# Patient Record
Sex: MALE | Race: BLACK OR AFRICAN AMERICAN | NOT HISPANIC OR LATINO | Employment: UNEMPLOYED | URBAN - METROPOLITAN AREA
[De-identification: names, ages, dates, MRNs, and addresses within clinical notes are randomized per-mention and may not be internally consistent; named-entity substitution may affect disease eponyms.]

---

## 2018-01-28 ENCOUNTER — HOSPITAL ENCOUNTER (EMERGENCY)
Facility: CLINIC | Age: 28
Discharge: HOME | End: 2018-01-28
Attending: EMERGENCY MEDICINE | Admitting: EMERGENCY MEDICINE
Payer: MEDICAID

## 2018-01-28 VITALS
HEIGHT: 66 IN | SYSTOLIC BLOOD PRESSURE: 130 MMHG | WEIGHT: 140 LBS | RESPIRATION RATE: 16 BRPM | TEMPERATURE: 97.2 F | BODY MASS INDEX: 22.5 KG/M2 | OXYGEN SATURATION: 100 % | DIASTOLIC BLOOD PRESSURE: 48 MMHG

## 2018-01-28 DIAGNOSIS — F19.10 DRUG ABUSE (H): ICD-10-CM

## 2018-01-28 PROCEDURE — 99282 EMERGENCY DEPT VISIT SF MDM: CPT

## 2018-01-28 ASSESSMENT — ENCOUNTER SYMPTOMS
ABDOMINAL PAIN: 0
VOMITING: 0
NUMBNESS: 0
SHORTNESS OF BREATH: 0
DIARRHEA: 0
NAUSEA: 0

## 2018-01-28 NOTE — DISCHARGE INSTRUCTIONS
Please stop using drugs as these can be harmful to your health.      Please return to the ED if any chest pain shortness weakness numbness or tingling.        Signs of Addiction: Social Use  Not everyone who takes a drink or tries a drug has a substance abuse problem. But for some people, what starts as social use can lead to problem use (abuse) and then addiction. Some people can have a drink or use another substance and then stop. They don t think about drinking or using again for a while. For some this will never lead to heavier use. For others, it may.    You can take it or leave it    You have a drink at a party or a glass of wine with dinner.    You tried a street drug a few times, but have no interest in using it repeatedly.     You take medicines only when needed and only as directed.    You don t think about using or plan the next time you ll use.  Using is still a choice  A social user can choose to say yes or no any time. This person is in control of his or her substance use. Think about these statements:    I can put down a drink without finishing it.    I can enjoy myself at a social event without using.    I can go without using or thinking about using.  If this describes you, you re most likely a social user. But ask yourself, can you really take it or leave it? You might try for a few weeks or even a month. If you find it hard or you just can t do it, maybe you need to think about your use again.  Date Last Reviewed: 2/1/2017 2000-2017 [employer]. [address] [place] [address], [place] [id number]. All rights reserved. This information is not intended as a substitute for professional medical care. Always follow your healthcare professional's instructions.          Alcohol Addiction  Are you addicted to alcohol?    You may be addicted to alcohol if your drinking harms yourself or others or leads to other problems with your daily life.  The medical term for this is alcohol use disorder. Your  healthcare provider may diagnose you with this disorder if you have at least two of the following problems within the span of a year:    You drink alcohol in larger amounts or for a longer period than you intended.    You frequently want to cut down or control alcohol use, or have frequently failed efforts to do so.    You spend a lot of time getting alcohol, using alcohol, or recovering from alcohol use.    You crave or have a strong desire or urge to drink alcohol.    Ongoing alcohol use makes it hard for you to be responsible at work, school, or home.    You continue to use alcohol even though you have had problems in relationships or social settings because of it.    You give up or miss important social, work, or recreational activities because of alcohol use.    You drink alcohol in situations in which it is physically unsafe, such as drinking then driving while intoxicated.    You continue to drink alcohol despite knowing it has caused physical or emotional problems.    You need more and more alcohol to get the same effects.    You hide how much alcohol you drink from family and friends.    You have withdrawal symptoms or use alcohol to avoid withdrawal symptoms.  Date Last Reviewed: 2/1/2017 2000-2017 [employer]. [address] [place] [address]. All rights reserved. This information is not intended as a substitute for professional medical care. Always follow your healthcare professional's instructions.

## 2018-01-28 NOTE — ED AVS SNAPSHOT
Emergency Department    [address] [place] [address]    Phone:  [phone]    Fax:  [phone]                                       Shelley Abdalla   MRN: [id number]    Department:   Emergency Department   Date of Visit:  1/28/2018           After Visit Summary Signature Page     I have received my discharge instructions, and my questions have been answered. I have discussed any challenges I see with this plan with the nurse or doctor.    ..........................................................................................................................................  Patient/Patient Representative Signature      ..........................................................................................................................................  Patient Representative Print Name and Relationship to Patient    ..................................................               ................................................  Date                                            Time    ..........................................................................................................................................  Reviewed by Signature/Title    ...................................................              ..............................................  Date                                                            Time

## 2018-01-28 NOTE — ED PROVIDER NOTES
"  History     Chief Complaint:  Addiction Problem     HPI   Shelley Abdalla is an otherwise healthy 27 year old male who presents with an addiction problem. The patient was brought here by [place] police. He states he took some K2 around 2 pm today and fell asleep on the bus. He does not have any complaints and does not want any help with his addiction. He denies suicidal or homicidal ideations, chest pain, abdominal pain, nausea, vomiting, diarrhea, shortness of breath, or numbness. He reports he has a safe place to stay and a ride home.    Allergies:  No known drug allergies    Medications:    The patient is currently on no regular medications.    Past Medical History:    The patient does not have any past pertinent medical history.    Past Surgical History:    History reviewed. No pertinent surgical history.    Family History:    History reviewed. No pertinent family history.     Social History:  Smoking status: Current Every day smoker  Alcohol use: No  Marital Status:  Single [1]     Review of Systems   Respiratory: Negative for shortness of breath.    Cardiovascular: Negative for chest pain.   Gastrointestinal: Negative for abdominal pain, diarrhea, nausea and vomiting.   Neurological: Negative for numbness.   Psychiatric/Behavioral: Negative for suicidal ideas.        No homicidal ideations   All other systems reviewed and are negative.    Physical Exam     Patient Vitals for the past 24 hrs:   BP Temp Temp src Heart Rate Resp SpO2 Height Weight   01/28/18 1618 130/48 97.2  F (36.2  C) Temporal 62 16 100 % 1.676 m (5' 6\") 63.5 kg (140 lb)       Physical Exam  Physical Exam   General:  Sitting on bed, comfortable appearing.   Head:  No obvious trauma to head  Ears, Nose:  External ears normal.  Nose normal.   Eyes:  Conjunctivae clear.  Pupils round and symmetry.    Neck:  Normal range of motion. Neck supple and symmetric.    Pulm/Chest:  No respiratory distress.  Breathing comfortably.    CV: Regular " rate and rhythm.    Neuro:  Alert. Moving all extremities.  Normal gait.    Skin:  Skin is warm and dry.    Psych:  Normal mood and affect. Behavior is normal.     Emergency Department Course   Emergency Department Course:  Past medical records, nursing notes, and vitals reviewed.  1655: I performed an exam of the patient as documented above. Clinical findings and plan explained to the Patient. Patient discharged home with instructions regarding supportive care, medications, and reasons to return as well as the importance of close follow-up were reviewed.     Impression & Plan    Medical Decision Making:  Shelley Abdalla is a 27 year old male otherwise healthy, presenting after using K2 recreationally. Vital signs unremarkable. Broad differential pursued including, but not limited to intoxication, ingestion, drug or alcohol abuse, etc. Overall, patient is well-appearing and cooperative. He reports this was recreational use. He does not use regularly. He is not wanting to have any help with his drug use. He did not use anything besides K2. No alcohol or any other ingestions. There are no thoughts of hurting self or others. No mental health concerns at this time. He is alert and oriented. He is walking around the emergency department. He appears to be clinically sober. No acute indication for admission. He does not meet hold criteria. He is alert and appropriate and appears to be safe to go home.  Patient was discharged in stable condition. He was offered outpatient services but declined. No other acute events in my care.    Diagnosis:    ICD-10-CM   1. Drug abuse F19.10     Disposition:  discharged to home    Megan Solares  1/28/2018    EMERGENCY DEPARTMENT    I, Megan Solares, am serving as a scribe at 4:55 PM on 1/28/2018 to document services personally performed by Melissa Lance MD based on my observations and the provider's statements to me.        Melissa Lance MD  01/29/18 0004

## 2018-01-28 NOTE — ED AVS SNAPSHOT
Emergency Department    [address] [facility] [address]    Phone:  [phone]    Fax:  [phone]                                       Shelley Abdalla   MRN: [id number]    Department:   Emergency Department   Date of Visit:  1/28/2018           Patient Information     Date Of Birth          1990        Your diagnoses for this visit were:     Drug abuse        You were seen by Melissa Lance MD.      Follow-up Information     Follow up with [facility]. Schedule an appointment as soon as possible for a visit in 3 days.    Contact information:    [address] [address] [phone]        Discharge Instructions       Please stop using drugs as these can be harmful to your health.      Please return to the ED if any chest pain shortness weakness numbness or tingling.        Signs of Addiction: Social Use  Not everyone who takes a drink or tries a drug has a substance abuse problem. But for some people, what starts as social use can lead to problem use (abuse) and then addiction. Some people can have a drink or use another substance and then stop. They don t think about drinking or using again for a while. For some this will never lead to heavier use. For others, it may.    You can take it or leave it    You have a drink at a party or a glass of wine with dinner.    You tried a street drug a few times, but have no interest in using it repeatedly.     You take medicines only when needed and only as directed.    You don t think about using or plan the next time you ll use.  Using is still a choice  A social user can choose to say yes or no any time. This person is in control of his or her substance use. Think about these statements:    I can put down a drink without finishing it.    I can enjoy myself at a social event without using.    I can go without using or thinking about using.  If this describes you,  you re most likely a social user. But ask yourself, can you really take it or leave it? You might try for a few weeks or even a month. If you find it hard or you just can t do it, maybe you need to think about your use again.  Date Last Reviewed: 2/1/2017 2000-2017 [employer] [address] [place] [address], [place] [address]. All rights reserved. This information is not intended as a substitute for professional medical care. Always follow your healthcare professional's instructions.          Alcohol Addiction  Are you addicted to alcohol?    You may be addicted to alcohol if your drinking harms yourself or others or leads to other problems with your daily life.  The medical term for this is alcohol use disorder. Your healthcare provider may diagnose you with this disorder if you have at least two of the following problems within the span of a year:    You drink alcohol in larger amounts or for a longer period than you intended.    You frequently want to cut down or control alcohol use, or have frequently failed efforts to do so.    You spend a lot of time getting alcohol, using alcohol, or recovering from alcohol use.    You crave or have a strong desire or urge to drink alcohol.    Ongoing alcohol use makes it hard for you to be responsible at work, school, or home.    You continue to use alcohol even though you have had problems in relationships or social settings because of it.    You give up or miss important social, work, or recreational activities because of alcohol use.    You drink alcohol in situations in which it is physically unsafe, such as drinking then driving while intoxicated.    You continue to drink alcohol despite knowing it has caused physical or emotional problems.    You need more and more alcohol to get the same effects.    You hide how much alcohol you drink from family and friends.    You have withdrawal symptoms or use alcohol to avoid withdrawal symptoms.  Date Last Reviewed:  2/1/2017 2000-2017 [employer]. [address] [place] [address], [place] [address]. All rights reserved. This information is not intended as a substitute for professional medical care. Always follow your healthcare professional's instructions.          24 Hour Appointment Hotline       To make an appointment at any [facility], call [phone] ([phone]). If you don't have a family doctor or clinic, we will help you find one. [facility] are conveniently located to serve the needs of you and your family.             Review of your medicines      Notice     You have not been prescribed any medications.            Orders Needing Specimen Collection     None      Pending Results     No orders found from 1/26/2018 to 1/29/2018.            Pending Culture Results     No orders found from 1/26/2018 to 1/29/2018.            Pending Results Instructions     If you had any lab results that were not finalized at the time of your Discharge, you can call the ED Lab Result RN at [phone]. You will be contacted by this team for any positive Lab results or changes in treatment. The nurses are available 7 days a week from 10A to 6:30P.  You can leave a message 24 hours per day and they will return your call.        Test Results From Your Hospital Stay               Clinical Quality Measure: Blood Pressure Screening     Your blood pressure was checked while you were in the emergency department today. The last reading we obtained was  BP: 130/48 . Please read the guidelines below about what these numbers mean and what you should do about them.  If your systolic blood pressure (the top number) is less than 120 and your diastolic blood pressure (the bottom number) is less than 80, then your blood pressure is normal. There is nothing more that you need to do about it.  If your systolic blood pressure (the top number) is 120-139 or your diastolic blood pressure (the bottom number) is 80-89, your blood  "pressure may be higher than it should be. You should have your blood pressure rechecked within a year by a primary care provider.  If your systolic blood pressure (the top number) is 140 or greater or your diastolic blood pressure (the bottom number) is 90 or greater, you may have high blood pressure. High blood pressure is treatable, but if left untreated over time it can put you at risk for heart attack, stroke, or kidney failure. You should have your blood pressure rechecked by a primary care provider within the next 4 weeks.  If your provider in the emergency department today gave you specific instructions to follow-up with your doctor or provider even sooner than that, you should follow that instruction and not wait for up to 4 weeks for your follow-up visit.        Thank you for choosing [facility]       Thank you for choosing [facility] for your care. Our goal is always to provide you with excellent care. Hearing back from our patients is one way we can continue to improve our services. Please take a few minutes to complete the written survey that you may receive in the mail after you visit with us. Thank you!        [facility] Information     [facility] lets you send messages to your doctor, view your test results, renew your prescriptions, schedule appointments and more. To sign up, go to [email or web address] . Click on \"Log in\" on the left side of the screen, which will take you to the Welcome page. Then click on \"Sign up Now\" on the right side of the page.     You will be asked to enter the access code listed below, as well as some personal information. Please follow the directions to create your username and password.     Your access code is: [email or web address]  Expires: 2018  5:06 PM     Your access code will  in 90 days. If you need help or a new code, please call your [facility] clinic or [phone].        Care EveryWhere ID     This is your Care EveryWhere ID. This could be used by other organizations " to access your [facility] medical records  [id number]        Equal Access to Services     FERMIN HODGSON : Estuardo Lou, raj marrufo, yung saucedo. So [facility] [phone].    ATENCIÓN: Si habla español, tiene a melvin disposición servicios gratuitos de asistencia lingüística. Llame al [phone].    We comply with applicable federal civil rights laws and [place] laws. We do not discriminate on the basis of race, color, national origin, age, disability, sex, sexual orientation, or gender identity.            After Visit Summary       This is your record. Keep this with you and show to your community pharmacist(s) and doctor(s) at your next visit.

## 2021-12-02 ENCOUNTER — HOSPITAL ENCOUNTER (EMERGENCY)
Facility: CLINIC | Age: 31
Discharge: HOME | End: 2021-12-02
Attending: EMERGENCY MEDICINE | Admitting: EMERGENCY MEDICINE

## 2021-12-02 VITALS
SYSTOLIC BLOOD PRESSURE: 130 MMHG | OXYGEN SATURATION: 99 % | RESPIRATION RATE: 27 BRPM | TEMPERATURE: 98.2 F | DIASTOLIC BLOOD PRESSURE: 66 MMHG | HEART RATE: 96 BPM | HEIGHT: 68 IN | WEIGHT: 150 LBS | BODY MASS INDEX: 22.73 KG/M2

## 2021-12-02 DIAGNOSIS — K21.00 GASTROESOPHAGEAL REFLUX DISEASE WITH ESOPHAGITIS WITHOUT HEMORRHAGE: ICD-10-CM

## 2021-12-02 LAB
ALBUMIN SERPL-MCNC: 4 G/DL (ref 3.4–5)
ALP SERPL-CCNC: 63 U/L (ref 40–150)
ALT SERPL W P-5'-P-CCNC: 26 U/L (ref 0–70)
ANION GAP SERPL CALCULATED.3IONS-SCNC: 7 MMOL/L (ref 3–14)
AST SERPL W P-5'-P-CCNC: 20 U/L (ref 0–45)
ATRIAL RATE - MUSE: 95 BPM
BASOPHILS # BLD AUTO: 0 10E3/UL (ref 0–0.2)
BASOPHILS NFR BLD AUTO: 0 %
BILIRUB SERPL-MCNC: 1 MG/DL (ref 0.2–1.3)
BUN SERPL-MCNC: 15 MG/DL (ref 7–30)
CALCIUM SERPL-MCNC: 9.1 MG/DL (ref 8.5–10.1)
CHLORIDE BLD-SCNC: 107 MMOL/L (ref 94–109)
CO2 SERPL-SCNC: 23 MMOL/L (ref 20–32)
CREAT SERPL-MCNC: 1.21 MG/DL (ref 0.66–1.25)
CRP SERPL-MCNC: 3.2 MG/L (ref 0–8)
DIASTOLIC BLOOD PRESSURE - MUSE: NORMAL MMHG
EOSINOPHIL # BLD AUTO: 0 10E3/UL (ref 0–0.7)
EOSINOPHIL NFR BLD AUTO: 0 %
ERYTHROCYTE [DISTWIDTH] IN BLOOD BY AUTOMATED COUNT: 13.1 % (ref 10–15)
ERYTHROCYTE [SEDIMENTATION RATE] IN BLOOD BY WESTERGREN METHOD: 7 MM/HR (ref 0–15)
FLUAV RNA SPEC QL NAA+PROBE: NEGATIVE
FLUBV RNA RESP QL NAA+PROBE: NEGATIVE
GFR SERPL CREATININE-BSD FRML MDRD: 80 ML/MIN/1.73M2
GLUCOSE BLD-MCNC: 89 MG/DL (ref 70–99)
HCT VFR BLD AUTO: 44.1 % (ref 40–53)
HGB BLD-MCNC: 14.6 G/DL (ref 13.3–17.7)
IMM GRANULOCYTES # BLD: 0.1 10E3/UL
IMM GRANULOCYTES NFR BLD: 0 %
INTERPRETATION ECG - MUSE: NORMAL
LIPASE SERPL-CCNC: 59 U/L (ref 73–393)
LYMPHOCYTES # BLD AUTO: 0.9 10E3/UL (ref 0.8–5.3)
LYMPHOCYTES NFR BLD AUTO: 6 %
MCH RBC QN AUTO: 29.6 PG (ref 26.5–33)
MCHC RBC AUTO-ENTMCNC: 33.1 G/DL (ref 31.5–36.5)
MCV RBC AUTO: 90 FL (ref 78–100)
MONOCYTES # BLD AUTO: 0.7 10E3/UL (ref 0–1.3)
MONOCYTES NFR BLD AUTO: 4 %
NEUTROPHILS # BLD AUTO: 13.6 10E3/UL (ref 1.6–8.3)
NEUTROPHILS NFR BLD AUTO: 90 %
NRBC # BLD AUTO: 0 10E3/UL
NRBC BLD AUTO-RTO: 0 /100
P AXIS - MUSE: 68 DEGREES
PLATELET # BLD AUTO: 234 10E3/UL (ref 150–450)
POTASSIUM BLD-SCNC: 3.8 MMOL/L (ref 3.4–5.3)
PR INTERVAL - MUSE: 148 MS
PROT SERPL-MCNC: 7.6 G/DL (ref 6.8–8.8)
QRS DURATION - MUSE: 82 MS
QT - MUSE: 344 MS
QTC - MUSE: 432 MS
R AXIS - MUSE: 56 DEGREES
RBC # BLD AUTO: 4.93 10E6/UL (ref 4.4–5.9)
SARS-COV-2 RNA RESP QL NAA+PROBE: NEGATIVE
SODIUM SERPL-SCNC: 137 MMOL/L (ref 133–144)
SYSTOLIC BLOOD PRESSURE - MUSE: NORMAL MMHG
T AXIS - MUSE: 48 DEGREES
TROPONIN I SERPL HS-MCNC: 15 NG/L
VENTRICULAR RATE- MUSE: 95 BPM
WBC # BLD AUTO: 15.2 10E3/UL (ref 4–11)

## 2021-12-02 PROCEDURE — 83690 ASSAY OF LIPASE: CPT | Performed by: EMERGENCY MEDICINE

## 2021-12-02 PROCEDURE — 96361 HYDRATE IV INFUSION ADD-ON: CPT

## 2021-12-02 PROCEDURE — 36415 COLL VENOUS BLD VENIPUNCTURE: CPT | Performed by: EMERGENCY MEDICINE

## 2021-12-02 PROCEDURE — C9803 HOPD COVID-19 SPEC COLLECT: HCPCS

## 2021-12-02 PROCEDURE — 250N000009 HC RX 250: Performed by: EMERGENCY MEDICINE

## 2021-12-02 PROCEDURE — 84484 ASSAY OF TROPONIN QUANT: CPT | Performed by: EMERGENCY MEDICINE

## 2021-12-02 PROCEDURE — 250N000013 HC RX MED GY IP 250 OP 250 PS 637: Performed by: EMERGENCY MEDICINE

## 2021-12-02 PROCEDURE — 87636 SARSCOV2 & INF A&B AMP PRB: CPT | Performed by: EMERGENCY MEDICINE

## 2021-12-02 PROCEDURE — 96374 THER/PROPH/DIAG INJ IV PUSH: CPT

## 2021-12-02 PROCEDURE — 250N000011 HC RX IP 250 OP 636: Performed by: EMERGENCY MEDICINE

## 2021-12-02 PROCEDURE — 258N000003 HC RX IP 258 OP 636: Performed by: EMERGENCY MEDICINE

## 2021-12-02 PROCEDURE — 99284 EMERGENCY DEPT VISIT MOD MDM: CPT | Mod: 25

## 2021-12-02 PROCEDURE — 85652 RBC SED RATE AUTOMATED: CPT | Performed by: EMERGENCY MEDICINE

## 2021-12-02 PROCEDURE — 80053 COMPREHEN METABOLIC PANEL: CPT | Performed by: EMERGENCY MEDICINE

## 2021-12-02 PROCEDURE — 85025 COMPLETE CBC W/AUTO DIFF WBC: CPT | Performed by: EMERGENCY MEDICINE

## 2021-12-02 PROCEDURE — 86140 C-REACTIVE PROTEIN: CPT | Performed by: EMERGENCY MEDICINE

## 2021-12-02 PROCEDURE — 93005 ELECTROCARDIOGRAM TRACING: CPT

## 2021-12-02 RX ORDER — FAMOTIDINE 20 MG/1
20 TABLET, FILM COATED ORAL 2 TIMES DAILY
Qty: 40 TABLET | Refills: 0 | Status: SHIPPED | OUTPATIENT
Start: 2021-12-02 | End: 2021-12-22

## 2021-12-02 RX ORDER — ONDANSETRON 2 MG/ML
4 INJECTION INTRAMUSCULAR; INTRAVENOUS ONCE
Status: COMPLETED | OUTPATIENT
Start: 2021-12-02 | End: 2021-12-02

## 2021-12-02 RX ORDER — ONDANSETRON 4 MG/1
4 TABLET, ORALLY DISINTEGRATING ORAL EVERY 8 HOURS PRN
Qty: 10 TABLET | Refills: 0 | Status: SHIPPED | OUTPATIENT
Start: 2021-12-02 | End: 2021-12-05

## 2021-12-02 RX ORDER — ACETAMINOPHEN 500 MG
1000 TABLET ORAL ONCE
Status: COMPLETED | OUTPATIENT
Start: 2021-12-02 | End: 2021-12-02

## 2021-12-02 RX ORDER — ONDANSETRON 4 MG/1
4 TABLET, ORALLY DISINTEGRATING ORAL ONCE
Status: DISCONTINUED | OUTPATIENT
Start: 2021-12-02 | End: 2021-12-03 | Stop reason: HOSPADM

## 2021-12-02 RX ORDER — SUCRALFATE 1 G/1
1 TABLET ORAL 4 TIMES DAILY
Qty: 56 TABLET | Refills: 0 | Status: SHIPPED | OUTPATIENT
Start: 2021-12-02 | End: 2021-12-16

## 2021-12-02 RX ADMIN — ACETAMINOPHEN 1000 MG: 500 TABLET, FILM COATED ORAL at 17:10

## 2021-12-02 RX ADMIN — ONDANSETRON 4 MG: 2 INJECTION INTRAMUSCULAR; INTRAVENOUS at 20:46

## 2021-12-02 RX ADMIN — LIDOCAINE HYDROCHLORIDE 30 ML: 20 SOLUTION ORAL; TOPICAL at 20:48

## 2021-12-02 RX ADMIN — SODIUM CHLORIDE, POTASSIUM CHLORIDE, SODIUM LACTATE AND CALCIUM CHLORIDE 1000 ML: 600; 310; 30; 20 INJECTION, SOLUTION INTRAVENOUS at 21:00

## 2021-12-02 ASSESSMENT — ENCOUNTER SYMPTOMS
HEADACHES: 1
VOMITING: 1
COUGH: 0
NAUSEA: 1

## 2021-12-02 ASSESSMENT — MIFFLIN-ST. JEOR: SCORE: 1614.9

## 2021-12-02 NOTE — ED TRIAGE NOTES
Arrives via EMS, headace he said started this morning. Usually drinks Hennesy bottle everyday. Denies drinking today N/V, did get oral zofran in rig. Rates pain 10/10.

## 2021-12-03 NOTE — ED PROVIDER NOTES
"  History   Chief Complaint:  Headache and Nausea & Vomiting       The history is provided by the patient.      Shelley Abdalla is a 31 year old male who presents via EMS with chest pain. Patient states mild headache that started this morning. He then developed burning chest pain. He also notes nausea and vomiting. Patient reports drinks Hennesy bottle daily. Denies alcohol use today but states he drank fairly heavily yesterday. Denies cough or other pain. No known COVID exposure. Patient is not COVID vaccinated.       Review of Systems   Respiratory: Negative for cough.    Cardiovascular: Positive for chest pain.   Gastrointestinal: Positive for nausea and vomiting.   Neurological: Positive for headaches.   All other systems reviewed and are negative.      Allergies:  The patient has no known allergies.     Medications:  The patient is currently on no regular medications.    Past Medical History:    Seizures   Hypokalemia  Marijuana abuse      Past Surgical History:    Hernia repair    Social History:  Patient presents to the ED via EMS.     Physical Exam     Patient Vitals for the past 24 hrs:   BP Temp Temp src Pulse Resp SpO2 Height Weight   12/02/21 1659 124/79 98.2  F (36.8  C) Temporal 99 20 99 % 1.727 m (5' 8\") 68 kg (150 lb)       Physical Exam  General: Alert, interactive in mild distress  Head:  Scalp is atraumatic  Eyes:  The pupils are equal, round, and reactive to light    EOM's intact    No scleral icterus  ENT:      Nose:  The external nose is normal  Ears:  External ears are normal  Mouth/Throat: The oropharynx is normal    Mucus membranes are dry      Neck:  Normal range of motion.      There is no rigidity.    Trachea is in the midline         CV:  Regular rate and rhythm    No murmur   Resp:  Breath sounds are clear bilaterally    Non-labored, no retractions or accessory muscle use      GI:  Abdomen is soft, no distension, no tenderness.       MS:  Normal strength in all 4 " extremities  Skin:  Warm and dry, No rash or lesions noted.  Neuro:  Strength 5/5 x4.     GCS: 15  Psych:  Awake. Alert.  Normal affect.      Appropriate interactions.        Emergency Department Course     ECG:  ECG taken at 2036, ECG read at 2055  Normal sinus rhythm. ST elevation, consider early repolarization, pericarditis, or injury. Abnormal ECG.  Rate 95 bpm. AK interval 148 ms. QRS duration 82 ms. QT/QTc 344/432 ms. P-R-T axes 68 56 48.     Laboratory:   CBC: WBC: 15.2 (H) , HGB: 14.6, PLT: 234    CMP: ALL WNL (Creatinine: 1.21)    Troponin (Collected 2045): 15    CRP inflammation: 3.2    Erythrocyte sedimentation rate auto: 7    Lipase: 59 (L)     Symptomatic Influenza A/B antigen & COVID-19 PCR: Negative    Emergency Department Course:    Reviewed:  I reviewed nursing notes, vitals, past medical history and care everywhere    Assessments:  2027 I obtained history and examined the patient as noted above.   2143 I rechecked the patient and explained findings.     Interventions:  1710 Tylenol 1000 mg PO  2046 Zofran 4 mg IV  2048 GI Cocktail 30 mL PO    Disposition:  The patient was discharged to home.       Impression & Plan     Medical Decision Making:  Following presentation history and physical examination were performed, the above work-up was undertaken.  He does have burning chest discomfort, his EKG demonstrates signs of early repolarization which is noted on a previous EKG from years ago.  Troponin is negative despite over 6 hours of symptoms and I doubt ACS and do not believe he needs any further work-up.  Laboratory work-up is otherwise unremarkable, his abdominal exam is benign.  There is no signs of an acute infectious etiology.  Think he is likely experiencing gastroesophageal reflux secondary to his alcohol use.  There is no signs of an acute GI bleed.  He was feeling improved after the medications above and I will discharge him with the medications below I recommended abstaining from alcohol  for the time being.  Patient was in agreement this plan subsequently discharged.        Covid-19  Shelley Abdalla was evaluated during a global COVID-19 pandemic, which necessitated consideration that the patient might be at risk for infection with the SARS-CoV-2 virus that causes COVID-19.   Applicable protocols for evaluation were followed during the patient's care.   COVID-19 was considered as part of the patient's evaluation. The plan for testing is:  a test was obtained during this visit.      Diagnosis:    ICD-10-CM    1. Gastroesophageal reflux disease with esophagitis without hemorrhage  K21.00 Primary Care Referral       Discharge Medications:  New Prescriptions    FAMOTIDINE (PEPCID) 20 MG TABLET    Take 1 tablet (20 mg) by mouth 2 times daily for 20 days    ONDANSETRON (ZOFRAN ODT) 4 MG ODT TAB    Take 1 tablet (4 mg) by mouth every 8 hours as needed for nausea    SUCRALFATE (CARAFATE) 1 GM TABLET    Take 1 tablet (1 g) by mouth 4 times daily for 14 days       Scribe Disclosure:  I, Niyah Trimble, am serving as a scribe at 8:27 PM on 12/2/2021 to document services personally performed by Eliseo Mathis MD based on my observations and the provider's statements to me.            Eliseo Mathis MD  12/02/21 5325

## 2024-04-11 SDOH — HEALTH STABILITY: PHYSICAL HEALTH: ON AVERAGE, HOW MANY DAYS PER WEEK DO YOU ENGAGE IN MODERATE TO STRENUOUS EXERCISE (LIKE A BRISK WALK)?: 7 DAYS

## 2024-04-11 SDOH — HEALTH STABILITY: PHYSICAL HEALTH: ON AVERAGE, HOW MANY MINUTES DO YOU ENGAGE IN EXERCISE AT THIS LEVEL?: 30 MIN

## 2024-04-11 ASSESSMENT — SOCIAL DETERMINANTS OF HEALTH (SDOH): HOW OFTEN DO YOU GET TOGETHER WITH FRIENDS OR RELATIVES?: PATIENT DECLINED

## 2024-04-11 NOTE — COMMUNITY RESOURCES LIST (ENGLISH)
April 11, 2024           YOUR PERSONALIZED LIST OF SERVICES & PROGRAMS           & SHELTER    Housing      [facility] ([facility]) - Adult [facility]ter [facility] ([facility])  [address] [place] [address] (Distance: 4.7 miles)  Phone: [phone]  Website: https://www.[facility].org/our-programs/adult-shelter-connect-[place]-shelter/  Language: English, Nepalese  Fee: Free      [facility] - Adult [facility] - [address] [address] (Distance: 5.3 miles)  Phone: [phone]  Website: http://www.saintolaf.org/  Language: English  Fee: Free  Accessibility: Ada accessible      [facility] - Housing Stabilization Services  Phone: [phone]) [phone]  Website: https://ValveXchange/Housing-Stabilization.html  Language: English  Hours: Mon 9:00 AM - 5:00 PM Tue 9:00 AM - 5:00 PM Wed 9:00 AM - 5:00 PM Thu 9:00 AM - 5:00 PM Fri 9:00 AM - 5:00 PM  Fee: Insurance  Accessibility: Deaf or hard of hearing, Translation services    Case Management      Living - Housing Support-[facility] (HWS-I)  [address] [place] [address] (Distance: 5.9 miles)  Phone: [phone]  Website: https://Senior Moments.Digital Theatre  Language: Turkmen, English, French  Fee: Insurance      [facility] With [facility]  [address] [place] [address] (Distance: 2.9 miles)  Phone: [phone]  Website: https://www.[employer]/contact  Language: English, Nepalese  Accessibility: Ada accessible, Blind accommodation, Deaf or hard of hearing, Translation services      [employer] - Housing Stabilization Services  Phone: [phone]  Website: https://homebasemn.com/  Language: English  Hours: Mon 8:00 AM - 4:00 PM Tue 8:00 AM - 4:00 PM Wed 8:00 AM - 4:00 PM Thu 8:00 AM - 4:00 PM Fri 8:00 AM - 4:00 PM  Fee: Free  Accessibility: Blind accommodation, Deaf or hard of hearing  Transportation Options: Free  transportation    Drop-In Services      [facility] - Drop-in center or day shelter  [address] [place] [address] (Distance: 6.7 miles)  Phone: [phone]  Language: English  Fee: Free  Accessibility: Ada accessible, Translation services      [facility] - Drop-in center or day shelter  [address] [place] [address] (Distance: 3.7 miles)  Phone: [phone]  Language: English  Fee: Free      [facility] - LAUNDRY LOVE  Website: http://www.laundrylove.org               IMPORTANT NUMBERS & WEBSITES        Emergency Services  911  .   United Way  211 http://211unitedway.org  .   Poison Control  (990) 344-7413 http://[place]poison.org http://wis[place]poison.org  .     Suicide and Crisis Lifeline  988 http://988OjOs.comline.org  .   Child[employer] [place] Child Abuse Hotline  820.118.9485 http://Childhelphotline.org   .   National Sexual Assault Hotline  (197) 524-8851 (HOPE) http://LEAFERn.org   .     National Runaway Safeline  (911) 618-4138 (RUNAWAY) http://EpigamiruProHatch.ClariPhy Communications  .   Pregnancy & Postpartum Support  Call/text 323-808-1994  [place]: http://ppsupportmn.org  [place]: http://Archive.com/wi  .   Substance Abuse National Helpline ([facility])  959-383-HELP (8427) http://Findtreatment.gov   .                DISCLAIMER: These resources have been generated via the [employer] Platform. [employer] does not endorse any service providers mentioned in this resource list. [employer] does not guarantee that the services mentioned in this resource list will be available to you or will improve your health or wellness.    [facility]

## 2024-04-12 ENCOUNTER — TELEPHONE (OUTPATIENT)
Dept: FAMILY MEDICINE | Facility: CLINIC | Age: 34
End: 2024-04-12

## 2024-04-12 ENCOUNTER — OFFICE VISIT (OUTPATIENT)
Dept: FAMILY MEDICINE | Facility: CLINIC | Age: 34
End: 2024-04-12
Payer: COMMERCIAL

## 2024-04-12 VITALS
RESPIRATION RATE: 16 BRPM | BODY MASS INDEX: 28.32 KG/M2 | HEART RATE: 78 BPM | SYSTOLIC BLOOD PRESSURE: 130 MMHG | DIASTOLIC BLOOD PRESSURE: 84 MMHG | OXYGEN SATURATION: 100 % | WEIGHT: 191.2 LBS | HEIGHT: 69 IN

## 2024-04-12 DIAGNOSIS — Z00.00 ROUTINE GENERAL MEDICAL EXAMINATION AT A HEALTH CARE FACILITY: ICD-10-CM

## 2024-04-12 DIAGNOSIS — Z13.1 SCREENING FOR DIABETES MELLITUS: Primary | ICD-10-CM

## 2024-04-12 DIAGNOSIS — Z13.220 SCREENING FOR HYPERLIPIDEMIA: ICD-10-CM

## 2024-04-12 LAB
ANION GAP SERPL CALCULATED.3IONS-SCNC: 8 MMOL/L (ref 7–15)
BUN SERPL-MCNC: 9.1 MG/DL (ref 6–20)
CALCIUM SERPL-MCNC: 9.3 MG/DL (ref 8.6–10)
CHLORIDE SERPL-SCNC: 105 MMOL/L (ref 98–107)
CHOLEST SERPL-MCNC: 230 MG/DL
CREAT SERPL-MCNC: 0.91 MG/DL (ref 0.67–1.17)
DEPRECATED HCO3 PLAS-SCNC: 27 MMOL/L (ref 22–29)
EGFRCR SERPLBLD CKD-EPI 2021: >90 ML/MIN/1.73M2
FASTING STATUS PATIENT QL REPORTED: YES
GLUCOSE SERPL-MCNC: 96 MG/DL (ref 70–99)
HBA1C MFR BLD: 5.4 % (ref 0–5.6)
HDLC SERPL-MCNC: 53 MG/DL
LDLC SERPL CALC-MCNC: 157 MG/DL
NONHDLC SERPL-MCNC: 177 MG/DL
POTASSIUM SERPL-SCNC: 3.9 MMOL/L (ref 3.4–5.3)
SODIUM SERPL-SCNC: 140 MMOL/L (ref 135–145)
TRIGL SERPL-MCNC: 100 MG/DL

## 2024-04-12 PROCEDURE — 99385 PREV VISIT NEW AGE 18-39: CPT | Performed by: INTERNAL MEDICINE

## 2024-04-12 PROCEDURE — 83036 HEMOGLOBIN GLYCOSYLATED A1C: CPT | Performed by: INTERNAL MEDICINE

## 2024-04-12 PROCEDURE — 80048 BASIC METABOLIC PNL TOTAL CA: CPT | Performed by: INTERNAL MEDICINE

## 2024-04-12 PROCEDURE — 36415 COLL VENOUS BLD VENIPUNCTURE: CPT | Performed by: INTERNAL MEDICINE

## 2024-04-12 PROCEDURE — 80061 LIPID PANEL: CPT | Performed by: INTERNAL MEDICINE

## 2024-04-12 ASSESSMENT — PAIN SCALES - GENERAL: PAINLEVEL: NO PAIN (0)

## 2024-04-12 NOTE — TELEPHONE ENCOUNTER
----- Message from Basil Smith MD sent at 4/12/2024  4:00 PM CDT -----    Please send letter to patient   Mr. Abdalla,      Your LDL (bad cholesterol)  was above goal.  Genetics, diet, weight and low exercise levels can contribute to this. Your HDL (good cholesterol) was normal.  This is good. Your triglycerides were normal. Elevated LDL cholesterol and triglycerides as well as low HDL cholesterol all increase a person's risk for heart and vascular disease. Maintaining a healthy diet with lean proteins, whole grains and healthy fats such as olive oil as well as regular exercise and maintaining an appropriate weight all contribute to healthier cholesterol levels.  Kidney function tests are  normal  A1c is normal indicating no prediabetes or diabetes  Please contact the clinic if you have additional questions.  Thank you.    Sincerely,    Basil Smith MD

## 2024-04-12 NOTE — PATIENT INSTRUCTIONS
At [facility], we strive to deliver an exceptional experience to you, every time we see you. If you receive a survey, please complete it as we do value your feedback.  If you have MyChart, you can expect to receive results automatically within 24 hours of their completion.  Your provider will send a note interpreting your results as well.   If you do not have MyChart, you should receive your results in about a week by mail.    Your care team:                            Family Medicine Internal Medicine   MD Arturo Perez, MD Courtney Marshall, MD Madison Noyola, PA-C    Jose L Pedro, MD Pediatrics   Fredi Benjamin, PA-C  Vy Patel, MD Kelley Paz APRISRAEL Estrada CNP, CNP, MD Noy Escalante, MD Sindi Smith, CNP  Same-Day (No follow up visit)   Danny Nichole, DEBBIE Evans, PA-C    Rosanne Fisher PA-C     Clinic hours: Monday - Thursday 7 am-6 pm; Fridays 7 am-5 pm.   Urgent care: Monday - Friday 10 am- 8 pm; Saturday and Sunday 9 am-5 pm.    Clinic: [phone]       [facility]: Monday - Thursday 8 am - 7 pm; Friday 8 am - 6 pm  [facility]: [phone]

## 2024-04-12 NOTE — PROGRESS NOTES
Preventive Care Visit  [facility] ALLEN Smith MD, Internal Medicine  Apr 12, 2024      Assessment & Plan     Screening for diabetes mellitus  He has a family history of diabetes  We will check A1c  - Hemoglobin A1c; Future  - Hemoglobin A1c    Screening for hyperlipidemia    - Lipid panel reflex to direct LDL Fasting; Future  - Lipid panel reflex to direct LDL Fasting    Routine general medical examination at a health care facility  He declined all vaccines today  I offered him pneumonia/hepatitis B/COVID vaccinations but he declined all of them  I have reviewed his charts  I reviewed his charts from [place]  He was admitted there with seizures in the past  That was in 2019  He discharged himself AMA  Review of the records indicate that the cause of this use was not ascertained but they are thinking it could be because of use of synthetic marijuana  Of note he was also drinking a lot of alcohol at that time   He tells me the last seizure was again in 2021 where he was admitted to [facility]  However the records from that indicate that he was admitted with chest pain and not seizure  He tells me he always has this burning feeling in the chest before the seizure comes on and then he has a seizure-like activity lasting 1 to 2 minutes and he did bite his tongue sometimes  No fecal or urinary incontinence  It is not clear at this point whether his seizures were precipitated by use of any drugs like synthetic marijuana or from alcohol withdrawal  He has been seizure-free for almost 3 years now  I discussed with him about seizure precautions  Without a proper established diagnosis from a neurologist I cannot ask him not to drive  I did offer him today a comprehensive neurological evaluation with MRI and EEG and referral to neurology  He declined offer because he is concerned about the bills that he is going to get  He told me that his financial situation is not good and once that  "improves he will consider this comprehensive evaluation  - Basic metabolic panel  (Ca, Cl, CO2, Creat, Gluc, K, Na, BUN); Future  - Basic metabolic panel  (Ca, Cl, CO2, Creat, Gluc, K, Na, BUN)        30 minutes spent by me on the date of the encounter doing chart review, history and exam, documentation and further activities per the note      Nicotine/Tobacco Cessation  He reports that he has been smoking. He has never used smokeless tobacco.  Nicotine/Tobacco Cessation Plan  Information offered: Patient not interested at this time      BMI  Estimated body mass index is 28.65 kg/m  as calculated from the following:    Height as of this encounter: 1.74 m (5' 8.5\").    Weight as of this encounter: 86.7 kg (191 lb 3.2 oz).   Weight management plan: Discussed healthy diet and exercise guidelines    Counseling  Appropriate preventive services were discussed with this patient, including applicable screening as appropriate for fall prevention, nutrition, physical activity, Tobacco-use cessation, weight loss and cognition.  Checklist reviewing preventive services available has been given to the patient.  Reviewed patient's diet, addressing concerns and/or questions.   The patient was instructed to see the dentist every 6 months.           Shreya Rosa is a 33 year old, presenting for the following:  Physical        4/12/2024     7:54 AM   Additional Questions   Roomed by garett        Health Care Directive  Patient does not have a Health Care Directive or Living Will: Discussed advance care planning with patient; however, patient declined at this time.    HPI  Here for physical            4/11/2024   General Health   How would you rate your overall physical health? Good   Feel stress (tense, anxious, or unable to sleep) Not at all         4/11/2024   Nutrition   Three or more servings of calcium each day? Yes   Diet: Other   If other, please elaborate: No meats besides chicken   How many servings of fruit and " vegetables per day? (!) 2-3   How many sweetened beverages each day? 0-1         4/11/2024   Exercise   Days per week of moderate/strenous exercise 7 days   Average minutes spent exercising at this level 30 min         4/11/2024   Social Factors   Frequency of gathering with friends or relatives Patient declined   Worry food won't last until get money to buy more No   Food not last or not have enough money for food? No   Do you have housing?  No   Are you worried about losing your housing? No   Lack of transportation? No   Unable to get utilities (heat,electricity)? No   Want help with housing or utility concern? No   (!) HOUSING CONCERN PRESENT      4/11/2024   Dental   Dentist two times every year? (!) NO         4/11/2024   TB Screening   Were you born outside of the [place]? No         Today's PHQ-2 Score:       4/12/2024     7:54 AM   PHQ-2 ( 1999 [employer])   Q1: Little interest or pleasure in doing things 0   Q2: Feeling down, depressed or hopeless 0   PHQ-2 Score 0   Q1: Little interest or pleasure in doing things Not at all   Q2: Feeling down, depressed or hopeless Not at all   PHQ-2 Score 0           4/11/2024   Substance Use   Alcohol more than 3/day or more than 7/wk No   Do you use any other substances recreationally? No     Social History     Tobacco Use    Smoking status: Every Day     Current packs/day: 0.25     Types: Cigarettes    Smokeless tobacco: Never   Substance Use Topics    Alcohol use: No    Drug use: Yes     Frequency: 2.0 times per week     Comment: k2             4/11/2024   One time HIV Screening   Previous HIV test? No         4/11/2024   STI Screening   New sexual partner(s) since last STI/HIV test? No         4/11/2024   Contraception/Family Planning   Questions about contraception or family planning No        Reviewed and updated as needed this visit by Provider                          Review of Systems  Constitutional, HEENT, cardiovascular, pulmonary, gi and gu systems are negative,  "except as otherwise noted.     Objective    Exam  /84   Pulse 78   Resp 16   Ht 1.74 m (5' 8.5\")   Wt 86.7 kg (191 lb 3.2 oz)   SpO2 100%   BMI 28.65 kg/m     Estimated body mass index is 28.65 kg/m  as calculated from the following:    Height as of this encounter: 1.74 m (5' 8.5\").    Weight as of this encounter: 86.7 kg (191 lb 3.2 oz).    Physical Exam  GENERAL: alert and no distress  EYES: Eyes grossly normal to inspection, PERRL and conjunctivae and sclerae normal  HENT: ear canals and TM's normal, nose and mouth without ulcers or lesions  NECK: no adenopathy, no asymmetry, masses, or scars  RESP: lungs clear to auscultation - no rales, rhonchi or wheezes  CV: regular rate and rhythm, normal S1 S2, no S3 or S4, no murmur, click or rub, no peripheral edema  ABDOMEN: soft, nontender, no hepatosplenomegaly, no masses and bowel sounds normal  MS: no gross musculoskeletal defects noted, no edema  SKIN: no suspicious lesions or rashes  NEURO: Normal strength and tone, mentation intact and speech normal  PSYCH: mentation appears normal, affect normal/bright        Signed Electronically by: Basil Smith MD    "

## 2024-04-12 NOTE — LETTER
April 12, 2024      Shelley Abdalla  [address]  [facility] [address]          Mr. Abdalla,       Your LDL (bad cholesterol)  was above goal.  Genetics, diet, weight and low exercise levels can contribute to this. Your HDL (good cholesterol) was normal.  This is good. Your triglycerides were normal. Elevated LDL cholesterol and triglycerides as well as low HDL cholesterol all increase a person's risk for heart and vascular disease. Maintaining a healthy diet with lean proteins, whole grains and healthy fats such as olive oil as well as regular exercise and maintaining an appropriate weight all contribute to healthier cholesterol levels.   Kidney function tests are  normal   A1c is normal indicating no prediabetes or diabetes   Please contact the clinic if you have additional questions.  Thank you.     Sincerely,     Basil Smith MD       Resulted Orders   Hemoglobin A1c   Result Value Ref Range    Hemoglobin A1C 5.4 0.0 - 5.6 %      Comment:      Normal <5.7%   Prediabetes 5.7-6.4%    Diabetes 6.5% or higher     Note: Adopted from ADA consensus guidelines.   Basic metabolic panel  (Ca, Cl, CO2, Creat, Gluc, K, Na, BUN)   Result Value Ref Range    Sodium 140 135 - 145 mmol/L      Comment:      Reference intervals for this test were updated on 09/26/2023 to more accurately reflect our healthy population. There may be differences in the flagging of prior results with similar values performed with this method. Interpretation of those prior results can be made in the context of the updated reference intervals.     Potassium 3.9 3.4 - 5.3 mmol/L    Chloride 105 98 - 107 mmol/L    Carbon Dioxide (CO2) 27 22 - 29 mmol/L    Anion Gap 8 7 - 15 mmol/L    Urea Nitrogen 9.1 6.0 - 20.0 mg/dL    Creatinine 0.91 0.67 - 1.17 mg/dL    GFR Estimate >90 >60 mL/min/1.73m2    Calcium 9.3 8.6 - 10.0 mg/dL    Glucose 96 70 - 99 mg/dL   Lipid panel reflex to direct LDL Fasting   Result Value Ref Range    Cholesterol 230 (H) <200  mg/dL    Triglycerides 100 <150 mg/dL    Direct Measure HDL 53 >=40 mg/dL    LDL Cholesterol Calculated 157 (H) <=100 mg/dL    Non HDL Cholesterol 177 (H) <130 mg/dL    Patient Fasting > 8hrs? Yes     Narrative    Cholesterol  Desirable:  <200 mg/dL    Triglycerides  Normal:  Less than 150 mg/dL  Borderline High:  150-199 mg/dL  High:  200-499 mg/dL  Very High:  Greater than or equal to 500 mg/dL    Direct Measure HDL  Female:  Greater than or equal to 50 mg/dL   Male:  Greater than or equal to 40 mg/dL    LDL Cholesterol  Desirable:  <100mg/dL  Above Desirable:  100-129 mg/dL   Borderline High:  130-159 mg/dL   High:  160-189 mg/dL   Very High:  >= 190 mg/dL    Non HDL Cholesterol  Desirable:  130 mg/dL  Above Desirable:  130-159 mg/dL  Borderline High:  160-189 mg/dL  High:  190-219 mg/dL  Very High:  Greater than or equal to 220 mg/dL       If you have any questions or concerns, please call the clinic at the number listed above.

## 2025-05-24 ENCOUNTER — HEALTH MAINTENANCE LETTER (OUTPATIENT)
Age: 35
End: 2025-05-24

## 2025-08-27 ENCOUNTER — TRANSCRIBE ORDERS (OUTPATIENT)
Dept: OTHER | Age: 35
End: 2025-08-27

## 2025-08-27 DIAGNOSIS — G40.909 EPILEPSY, UNSPECIFIED, NOT INTRACTABLE, WITHOUT STATUS EPILEPTICUS (H): Primary | ICD-10-CM
